# Patient Record
Sex: MALE | Race: WHITE
[De-identification: names, ages, dates, MRNs, and addresses within clinical notes are randomized per-mention and may not be internally consistent; named-entity substitution may affect disease eponyms.]

---

## 2018-01-25 ENCOUNTER — TRANSCRIPTION ENCOUNTER (OUTPATIENT)
Age: 48
End: 2018-01-25

## 2018-02-08 ENCOUNTER — TRANSCRIPTION ENCOUNTER (OUTPATIENT)
Age: 48
End: 2018-02-08

## 2018-09-19 ENCOUNTER — TRANSCRIPTION ENCOUNTER (OUTPATIENT)
Age: 48
End: 2018-09-19

## 2019-01-04 ENCOUNTER — TRANSCRIPTION ENCOUNTER (OUTPATIENT)
Age: 49
End: 2019-01-04

## 2019-08-29 ENCOUNTER — TRANSCRIPTION ENCOUNTER (OUTPATIENT)
Age: 49
End: 2019-08-29

## 2019-12-25 ENCOUNTER — TRANSCRIPTION ENCOUNTER (OUTPATIENT)
Age: 49
End: 2019-12-25

## 2020-03-12 ENCOUNTER — TRANSCRIPTION ENCOUNTER (OUTPATIENT)
Age: 50
End: 2020-03-12

## 2021-08-09 ENCOUNTER — TRANSCRIPTION ENCOUNTER (OUTPATIENT)
Age: 51
End: 2021-08-09

## 2022-06-02 ENCOUNTER — NON-APPOINTMENT (OUTPATIENT)
Age: 52
End: 2022-06-02

## 2024-03-18 ENCOUNTER — NON-APPOINTMENT (OUTPATIENT)
Age: 54
End: 2024-03-18

## 2024-03-21 ENCOUNTER — APPOINTMENT (OUTPATIENT)
Dept: PODIATRY | Facility: CLINIC | Age: 54
End: 2024-03-21
Payer: COMMERCIAL

## 2024-03-21 VITALS — BODY MASS INDEX: 32.51 KG/M2 | WEIGHT: 240 LBS | HEIGHT: 72 IN

## 2024-03-21 DIAGNOSIS — Z78.9 OTHER SPECIFIED HEALTH STATUS: ICD-10-CM

## 2024-03-21 DIAGNOSIS — M77.52 OTHER ENTHESOPATHY OF LT FOOT AND ANKLE: ICD-10-CM

## 2024-03-21 DIAGNOSIS — M77.51 OTHER ENTHESOPATHY OF RT FOOT AND ANKLE: ICD-10-CM

## 2024-03-21 PROBLEM — Z00.00 ENCOUNTER FOR PREVENTIVE HEALTH EXAMINATION: Status: ACTIVE | Noted: 2024-03-21

## 2024-03-21 PROCEDURE — L3000: CPT | Mod: RT

## 2024-03-21 PROCEDURE — 99203 OFFICE O/P NEW LOW 30 MIN: CPT

## 2024-03-21 NOTE — PHYSICAL EXAM
[General Appearance - Alert] : alert [General Appearance - In No Acute Distress] : in no acute distress [No Joint Swelling] : no joint swelling [Normal Foot/Ankle] : Both lower extremities were exposed and visualized. Standing exam demonstrates normal foot posture and alignment. Hindfoot exam shows no hindfoot valgus or varus [Skin Color & Pigmentation] : normal skin color and pigmentation [Skin Turgor] : normal skin turgor [] : no rash [Skin Lesions] : no skin lesions [Sensation] : the sensory exam was normal to light touch and pinprick [No Focal Deficits] : no focal deficits [Deep Tendon Reflexes (DTR)] : deep tendon reflexes were 2+ and symmetric [Motor Exam] : the motor exam was normal [Oriented To Time, Place, And Person] : oriented to person, place, and time [Impaired Insight] : insight and judgment were intact [Affect] : the affect was normal [FreeTextEntry1] : Vascular exam reveals palpable pedal pulses, the foot is warm to touch, there was good capillary fill time, the skin is normal in appearance there is no evidence of vascular disease or compromise at this time [de-identified] : lateral column foot pain, EDL pain [Skin Induration] : no skin induration [Foot Ulcer] : no foot ulcer

## 2024-03-21 NOTE — HISTORY OF PRESENT ILLNESS
[FreeTextEntry1] : Location: dorsolateral aspect of both feet Duration: many years Etiology: many years of athletics and martial arts Past Tx: old orthotics Exacerbated by: excessive weight bearing

## 2024-03-21 NOTE — REVIEW OF SYSTEMS
[As Noted in HPI] : as noted in HPI [Negative] : Endocrine [Joint Swelling] : no joint swelling [Joint Stiffness] : no joint stiffness

## 2024-03-21 NOTE — PROCEDURE
[FreeTextEntry1] : Based on my physical examination and my clinical findings and the patient's description of the symptoms, a complete differential diagnosis was reviewed with the patient. Possible diagnoses as well as treatment options explained in great detail. All questions asked and answered appropriately.  I had a lengthy discussion with the patient all questions asked and answered appropriately regarding a diagnosis of tendinitis/tenosynovitis. I did explain to the patient etiology of this diagnosis as well as treatment options. It is an inflammation of the tendons on the top of the foot. In many cases both pressure as well as excessive motion can cause inflammation of the tendons and is treated with a combination of immobilization, anti-inflammatories, physical therapy, or combination of all of the above. Patient was given at home exercises to do and I did explain to them that there is a need for strict compliance to my chosen treatment options.All questions asked and answered appropriately to the patient's satisfaction. , A complete and thorough evaluation of the type of shoes they should be wearing and type of shoes for this time of year was discussed with patient. During the evaluation and management I had a lengthy discussion with the patient regarding benefits of functional foot orthoses. I explained to the patient the etiology and treatment options and one of them included the offloading and balancing of the painful portion of the foot. I explained the importance of balancing in offloading the painful area as part of the overall treatment process to advance healing. I have asked the patient to consider this as part of the treatment After a lengthy discussion with the patient regarding the possible benefits of orthotics and what we hope to achieve with them as it relates to their diagnosis the patient has agreed to be casted for the devices, The patient was then casted for a pair of custom functional foot orthoses with the subtalar joint in neutral, the forefoot locked, The patient was advised that they will be notified when the orthotics are returned from the laboratory, Should there be any questions or concerns they were advised to contact the office immediately, Educational literature regarding orthotics were dispensed, Included in the casting for orthotics was an overall gait exam and biomechanical evaluation An overall gait examination was performed where the rearfoot and the midfoot and forefoot was evaluated both with the patient walking away and then towards me. then again repeated on their toes and their heels. I then explained my findings to the patient and then may benefit from a pair of orthotics and how the orthotics would control their symptoms a complete and comprehensive lower extremity biomechanical exam was performed., I evaluated the rear foot the subtalar joint the midfoot and forefoot during the comprehensive gait evaluation, muscle strength as well as muscle strength testing was incorporated into my findings when evaluating the lower extremity biomechanics., my findings were discussed reviewed and explained to the patient, I do think the patient would benefit from a pair of custom molded foot orthoses I have reviewed the benefits of the orthotics and advised they would benefit and have recommended they proceed with fabrication

## 2024-04-26 ENCOUNTER — APPOINTMENT (OUTPATIENT)
Dept: PODIATRY | Facility: CLINIC | Age: 54
End: 2024-04-26
Payer: COMMERCIAL

## 2024-04-26 DIAGNOSIS — L60.2 ONYCHOGRYPHOSIS: ICD-10-CM

## 2024-04-26 DIAGNOSIS — M76.71 PERONEAL TENDINITIS, RIGHT LEG: ICD-10-CM

## 2024-04-26 DIAGNOSIS — L60.3 NAIL DYSTROPHY: ICD-10-CM

## 2024-04-26 DIAGNOSIS — M76.72 PERONEAL TENDINITIS, LEFT LEG: ICD-10-CM

## 2024-04-26 PROCEDURE — 97760 ORTHOTIC MGMT&TRAING 1ST ENC: CPT | Mod: NC

## 2024-04-26 PROCEDURE — 99213 OFFICE O/P EST LOW 20 MIN: CPT

## 2024-04-26 NOTE — PHYSICAL EXAM
[No Joint Swelling] : no joint swelling [Normal Foot/Ankle] : Both lower extremities were exposed and visualized. Standing exam demonstrates normal foot posture and alignment. Hindfoot exam shows no hindfoot valgus or varus [de-identified] : lateral column foot pain, EDL pain [Skin Color & Pigmentation] : normal skin color and pigmentation [Skin Turgor] : normal skin turgor [] : no rash [Skin Lesions] : no skin lesions [Foot Ulcer] : no foot ulcer [Skin Induration] : no skin induration [FreeTextEntry1] : The patient has all contributing factors to onychomycosis including but not limited to thickness, subungual debris, discoloration and partial lysis and they are brittle when cut.

## 2024-04-26 NOTE — HISTORY OF PRESENT ILLNESS
[FreeTextEntry1] : Location: dorsolateral aspect of both feet Duration: many years Etiology: many years of athletics and martial arts Past Tx: old orthotics Exacerbated by: excessive weight bearing  The patient presents today for evaluation, fitting and dispensing of custom made foot orthotics  New cc: Location-several nails right foot Duration-many years Past tx-none--self tx

## 2024-04-26 NOTE — PROCEDURE
[FreeTextEntry1] : The patient presents for dispensing of custom molded foot orthotics. I have removed the orthotics from the packaging and I have examined him. They do appear to be made as per my instructions regarding materials additions corrections. Upon placing him to the patient's foot they do appear to fit nicely. There is no material failure nor gapping. They were then trimmed to fit and placed inside the patient's shoe gear. There appears to be a good fit. Upon initial ambulation the patient has no initial complaints regarding pain off edges were tight fit. The patient did ambulate around the office for several minutes and had a favorable result. I then explained to the patient the normal break-in period. The paperwork supplied by the laboratory was reviewed with the patient. They understand a normal break-in period is to be gradual over several weeks. I've advised the patient that different, weird, and uncomfortable are certainly acceptable words in the beginning however pain, blister and callus are things that should not occur. Once the proper break-in was explained to the patient they were given an appointment to follow up.  A complete and thorough evaluation of the type of shoes they should be wearing and type of shoes for this time of year was discussed with patient.  new CC A lengthy and inform a discussion with the patient regarding different types of treatments for the mycotic nail disease. I stressed clinical versus mycologic cure rates and anticipated success rates regarding topical medications oral medications as well as laser therapy. I discussed in great length with the patient the success rates and success rates anticipated. There were no guarantees given regarding any of the treatment reviewed. I did explain to the patient that there are risks to oral antifungal therapy however those risks can be greatly decreased with obtaining blood tests prior and possibly during the treatment if indicated. The patient will weigh their options and contact the office A significant portion of nail and nail bed debris taken for nail fungus culture A significant and tissue sample was taken for a KOH prep and the patient will be notified of the results follow up appt 4 weeks

## 2024-04-29 LAB — CALCOFLUOR WHITE SPEC: ABNORMAL

## 2024-05-01 LAB — CORE LAB BIOPSY: NORMAL

## 2024-05-17 ENCOUNTER — APPOINTMENT (OUTPATIENT)
Dept: PODIATRY | Facility: CLINIC | Age: 54
End: 2024-05-17